# Patient Record
Sex: FEMALE | Race: WHITE | NOT HISPANIC OR LATINO | URBAN - NONMETROPOLITAN AREA
[De-identification: names, ages, dates, MRNs, and addresses within clinical notes are randomized per-mention and may not be internally consistent; named-entity substitution may affect disease eponyms.]

---

## 2018-04-19 ENCOUNTER — APPOINTMENT (RX ONLY)
Dept: URBAN - NONMETROPOLITAN AREA CLINIC 30 | Facility: CLINIC | Age: 33
Setting detail: DERMATOLOGY
End: 2018-04-19

## 2018-04-19 VITALS — DIASTOLIC BLOOD PRESSURE: 70 MMHG | HEIGHT: 67 IN | WEIGHT: 155 LBS | SYSTOLIC BLOOD PRESSURE: 120 MMHG

## 2018-04-19 DIAGNOSIS — L29.8 OTHER PRURITUS: ICD-10-CM

## 2018-04-19 DIAGNOSIS — L70.0 ACNE VULGARIS: ICD-10-CM

## 2018-04-19 DIAGNOSIS — L73.9 FOLLICULAR DISORDER, UNSPECIFIED: ICD-10-CM

## 2018-04-19 DIAGNOSIS — L738 OTHER SPECIFIED DISEASES OF HAIR AND HAIR FOLLICLES: ICD-10-CM

## 2018-04-19 DIAGNOSIS — L29.89 OTHER PRURITUS: ICD-10-CM

## 2018-04-19 DIAGNOSIS — L663 OTHER SPECIFIED DISEASES OF HAIR AND HAIR FOLLICLES: ICD-10-CM

## 2018-04-19 PROBLEM — L02.92 FURUNCLE, UNSPECIFIED: Status: ACTIVE | Noted: 2018-04-19

## 2018-04-19 PROCEDURE — 99215 OFFICE O/P EST HI 40 MIN: CPT

## 2018-04-19 PROCEDURE — ? COUNSELING

## 2018-04-19 PROCEDURE — ? PATIENT SPECIFIC COUNSELING

## 2018-04-19 PROCEDURE — ? ORDER TESTS

## 2018-04-19 PROCEDURE — ? PRESCRIPTION

## 2018-04-19 RX ORDER — ADAPALENE 3 MG/G
GEL TOPICAL
Qty: 1 | Refills: 4 | Status: ERX | COMMUNITY
Start: 2018-04-19

## 2018-04-19 RX ORDER — KETOCONAZOLE 20 MG/G
CREAM TOPICAL
Qty: 2 | Refills: 5 | Status: ERX | COMMUNITY
Start: 2018-04-19

## 2018-04-19 RX ADMIN — ADAPALENE: 3 GEL TOPICAL at 00:00

## 2018-04-19 RX ADMIN — KETOCONAZOLE: 20 CREAM TOPICAL at 00:00

## 2018-04-19 NOTE — PROCEDURE: MIPS QUALITY
Quality 128: Preventive Care And Screening: Body Mass Index (Bmi) Screening And Follow-Up Plan: BMI is documented above normal parameters and a follow-up plan is documented
Quality 111:Pneumonia Vaccination Status For Older Adults: Pneumococcal Vaccination Previously Received
Quality 431: Preventive Care And Screening: Unhealthy Alcohol Use - Screening: Patient screened for unhealthy alcohol use using a single question and scores less than 2 times per year
Quality 130: Documentation Of Current Medications In The Medical Record: Current Medications Documented
Quality 110: Preventive Care And Screening: Influenza Immunization: Influenza Immunization Administered during Influenza season
Quality 402: Tobacco Use And Help With Quitting Among Adolescents: Patient screened for tobacco and never smoked
Detail Level: Generalized
Quality 265: Biopsy Follow-Up: Biopsy results reviewed, communicated, tracked, and documented

## 2018-04-19 NOTE — PROCEDURE: COUNSELING
Topical Sulfur Applications Pregnancy And Lactation Text: This medication is Pregnancy Category C and has an unknown safety profile during pregnancy. It is unknown if this topical medication is excreted in breast milk.
Topical Sulfur Applications Counseling: Topical Sulfur Counseling: Patient counseled that this medication may cause skin irritation or allergic reactions.  In the event of skin irritation, the patient was advised to reduce the amount of the drug applied or use it less frequently.   The patient verbalized understanding of the proper use and possible adverse effects of topical sulfur application.  All of the patient's questions and concerns were addressed.
Bactrim Counseling:  I discussed with the patient the risks of sulfa antibiotics including but not limited to GI upset, allergic reaction, drug rash, diarrhea, dizziness, photosensitivity, and yeast infections.  Rarely, more serious reactions can occur including but not limited to aplastic anemia, agranulocytosis, methemoglobinemia, blood dyscrasias, liver or kidney failure, lung infiltrates or desquamative/blistering drug rashes.
Spironolactone Counseling: Patient advised regarding risks of diarrhea, abdominal pain, hyperkalemia, birth defects (for female patients), liver toxicity and renal toxicity. The patient may need blood work to monitor liver and kidney function and potassium levels while on therapy. The patient verbalized understanding of the proper use and possible adverse effects of spironolactone.  All of the patient's questions and concerns were addressed.
Tetracycline Pregnancy And Lactation Text: This medication is Pregnancy Category D and not consider safe during pregnancy. It is also excreted in breast milk.
Topical Retinoid counseling:  Patient advised to apply a pea-sized amount only at bedtime and wait 30 minutes after washing their face before applying.  If too drying, patient may add a non-comedogenic moisturizer. The patient verbalized understanding of the proper use and possible adverse effects of retinoids.  All of the patient's questions and concerns were addressed.
Erythromycin Pregnancy And Lactation Text: This medication is Pregnancy Category B and is considered safe during pregnancy. It is also excreted in breast milk.
Azithromycin Pregnancy And Lactation Text: This medication is considered safe during pregnancy and is also secreted in breast milk.
Birth Control Pills Counseling: Birth Control Pill Counseling: I discussed with the patient the potential side effects of OCPs including but not limited to increased risk of stroke, heart attack, thrombophlebitis, deep venous thrombosis, hepatic adenomas, breast changes, GI upset, headaches, and depression.  The patient verbalized understanding of the proper use and possible adverse effects of OCPs. All of the patient's questions and concerns were addressed.
Isotretinoin Pregnancy And Lactation Text: This medication is Pregnancy Category X and is considered extremely dangerous during pregnancy. It is unknown if it is excreted in breast milk.
Topical Retinoid Pregnancy And Lactation Text: This medication is Pregnancy Category C. It is unknown if this medication is excreted in breast milk.
Azithromycin Counseling:  I discussed with the patient the risks of azithromycin including but not limited to GI upset, allergic reaction, drug rash, diarrhea, and yeast infections.
Tazorac Counseling:  Patient advised that medication is irritating and drying.  Patient may need to apply sparingly and wash off after an hour before eventually leaving it on overnight.  The patient verbalized understanding of the proper use and possible adverse effects of tazorac.  All of the patient's questions and concerns were addressed.
Dapsone Pregnancy And Lactation Text: This medication is Pregnancy Category C and is not considered safe during pregnancy or breast feeding.
Tetracycline Counseling: Patient counseled regarding possible photosensitivity and increased risk for sunburn.  Patient instructed to avoid sunlight, if possible.  When exposed to sunlight, patients should wear protective clothing, sunglasses, and sunscreen.  The patient was instructed to call the office immediately if the following severe adverse effects occur:  hearing changes, easy bruising/bleeding, severe headache, or vision changes.  The patient verbalized understanding of the proper use and possible adverse effects of tetracycline.  All of the patient's questions and concerns were addressed. Patient understands to avoid pregnancy while on therapy due to potential birth defects.
High Dose Vitamin A Pregnancy And Lactation Text: High dose vitamin A therapy is contraindicated during pregnancy and breast feeding.
Include Pregnancy/Lactation Warning?: No
Bactrim Pregnancy And Lactation Text: This medication is Pregnancy Category D and is known to cause fetal risk.  It is also excreted in breast milk.
Tazorac Pregnancy And Lactation Text: This medication is not safe during pregnancy. It is unknown if this medication is excreted in breast milk.
Benzoyl Peroxide Pregnancy And Lactation Text: This medication is Pregnancy Category C. It is unknown if benzoyl peroxide is excreted in breast milk.
Topical Clindamycin Counseling: Patient counseled that this medication may cause skin irritation or allergic reactions.  In the event of skin irritation, the patient was advised to reduce the amount of the drug applied or use it less frequently.   The patient verbalized understanding of the proper use and possible adverse effects of clindamycin.  All of the patient's questions and concerns were addressed.
Spironolactone Pregnancy And Lactation Text: This medication can cause feminization of the male fetus and should be avoided during pregnancy. The active metabolite is also found in breast milk.
Isotretinoin Counseling: Patient should get monthly blood tests, not donate blood, not drive at night if vision affected, not share medication, and not undergo elective surgery for 6 months after tx completed. Side effects reviewed, pt to contact office should one occur.
Dapsone Counseling: I discussed with the patient the risks of dapsone including but not limited to hemolytic anemia, agranulocytosis, rashes, methemoglobinemia, kidney failure, peripheral neuropathy, headaches, GI upset, and liver toxicity.  Patients who start dapsone require monitoring including baseline LFTs and weekly CBCs for the first month, then every month thereafter.  The patient verbalized understanding of the proper use and possible adverse effects of dapsone.  All of the patient's questions and concerns were addressed.
Benzoyl Peroxide Counseling: Patient counseled that medicine may cause skin irritation and bleach clothing.  In the event of skin irritation, the patient was advised to reduce the amount of the drug applied or use it less frequently.   The patient verbalized understanding of the proper use and possible adverse effects of benzoyl peroxide.  All of the patient's questions and concerns were addressed.
Detail Level: Zone
Detail Level: Generalized
Doxycycline Pregnancy And Lactation Text: This medication is Pregnancy Category D and not consider safe during pregnancy. It is also excreted in breast milk but is considered safe for shorter treatment courses.
High Dose Vitamin A Counseling: Side effects reviewed, pt to contact office should one occur.
Minocycline Counseling: Patient advised regarding possible photosensitivity and discoloration of the teeth, skin, lips, tongue and gums.  Patient instructed to avoid sunlight, if possible.  When exposed to sunlight, patients should wear protective clothing, sunglasses, and sunscreen.  The patient was instructed to call the office immediately if the following severe adverse effects occur:  hearing changes, easy bruising/bleeding, severe headache, or vision changes.  The patient verbalized understanding of the proper use and possible adverse effects of minocycline.  All of the patient's questions and concerns were addressed.
Topical Clindamycin Pregnancy And Lactation Text: This medication is Pregnancy Category B and is considered safe during pregnancy. It is unknown if it is excreted in breast milk.
Doxycycline Counseling:  Patient counseled regarding possible photosensitivity and increased risk for sunburn.  Patient instructed to avoid sunlight, if possible.  When exposed to sunlight, patients should wear protective clothing, sunglasses, and sunscreen.  The patient was instructed to call the office immediately if the following severe adverse effects occur:  hearing changes, easy bruising/bleeding, severe headache, or vision changes.  The patient verbalized understanding of the proper use and possible adverse effects of doxycycline.  All of the patient's questions and concerns were addressed.
Birth Control Pills Pregnancy And Lactation Text: This medication should be avoided if pregnant and for the first 30 days post-partum.
Erythromycin Counseling:  I discussed with the patient the risks of erythromycin including but not limited to GI upset, allergic reaction, drug rash, diarrhea, increase in liver enzymes, and yeast infections.

## 2018-04-19 NOTE — PROCEDURE: PATIENT SPECIFIC COUNSELING
Detail Level: Detailed
Stop loofah scrubbing. Didn’t like tretinoin in past.
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 She has recently received treatment from a dermatologist in [place] and then from here PCP with multiple course of oral antifungal s and it improves. She has a bag of numerous topical OTC and prescriptions. Zinc bar has helped but she stopped bc thought she should try other things.\\n\\nI advised that she has mild acne on chin but does not have pityrosporum folliculitis at that location. On the trunk there were occasional 2 mm pink papule pustules. Discussed hepatotoxicity can occur with systemic antifungals and not best management long term for this chronic problem. Advised resume zinc bar once daily, stop scrubbing with loofah and try ketoconazole cream again.\\n\\nSlien was tearful throughout most of the visit and for a large part of the visit she resused to allow my assistant in the room to scribe.\\nSlien had questions at end of visit about tests to determine effectiveness of psychiatric meds and I advised she call her psychiatrist to discuss. She appeared anxious and depressed today.
Pt complains of severe itch and burn to all of her skin nightly. Not just at areas of rash or when rash is active. Itch does not waken from sleep but worse in past year. She states has not had we for generalized pruritus but read about it.  Advised milder skin care, vanicream to be continued. Consider decrease bathing to once daily\\n\\nRV 1 week
Patient uses 2% pyrithione zinc bar, selsum blue, zinc therapy soap.

## 2018-11-01 ENCOUNTER — RX ONLY (OUTPATIENT)
Age: 33
Setting detail: RX ONLY
End: 2018-11-01

## 2018-11-01 ENCOUNTER — APPOINTMENT (RX ONLY)
Dept: URBAN - NONMETROPOLITAN AREA CLINIC 29 | Facility: CLINIC | Age: 33
Setting detail: DERMATOLOGY
End: 2018-11-01

## 2018-11-01 VITALS — HEIGHT: 67 IN | WEIGHT: 180 LBS | SYSTOLIC BLOOD PRESSURE: 110 MMHG | DIASTOLIC BLOOD PRESSURE: 78 MMHG

## 2018-11-01 DIAGNOSIS — L71.0 PERIORAL DERMATITIS: ICD-10-CM

## 2018-11-01 DIAGNOSIS — B36.8 OTHER SPECIFIED SUPERFICIAL MYCOSES: ICD-10-CM

## 2018-11-01 PROCEDURE — ? PRESCRIPTION

## 2018-11-01 PROCEDURE — 99213 OFFICE O/P EST LOW 20 MIN: CPT

## 2018-11-01 PROCEDURE — ? PATIENT SPECIFIC COUNSELING

## 2018-11-01 PROCEDURE — ? COUNSELING

## 2018-11-01 RX ORDER — ITRACONAZOLE 200 MG/1
TABLET ORAL
Qty: 2 | Refills: 3 | Status: ERX | COMMUNITY
Start: 2018-11-01

## 2018-11-01 RX ORDER — CLINDAMYCIN PHOSPHATE 10 MG/ML
LOTION TOPICAL
Qty: 1 | Refills: 2 | Status: ERX | COMMUNITY
Start: 2018-11-01

## 2018-11-01 RX ORDER — ITRACONAZOLE 200 MG/1
TABLET ORAL
Qty: 7 | Refills: 3 | Status: ERX

## 2018-11-01 RX ORDER — KETOCONAZOLE 20 MG/G
CREAM TOPICAL
Qty: 2 | Refills: 5 | Status: ERX

## 2018-11-01 RX ADMIN — CLINDAMYCIN PHOSPHATE: 10 LOTION TOPICAL at 00:00

## 2018-11-01 RX ADMIN — ITRACONAZOLE: 200 TABLET ORAL at 00:00

## 2018-11-01 ASSESSMENT — LOCATION SIMPLE DESCRIPTION DERM
LOCATION SIMPLE: RIGHT CHEEK
LOCATION SIMPLE: LEFT LIP
LOCATION SIMPLE: CHEST

## 2018-11-01 ASSESSMENT — LOCATION DETAILED DESCRIPTION DERM
LOCATION DETAILED: LEFT LOWER CUTANEOUS LIP
LOCATION DETAILED: MIDDLE STERNUM
LOCATION DETAILED: RIGHT INFERIOR MEDIAL MALAR CHEEK

## 2018-11-01 ASSESSMENT — LOCATION ZONE DERM
LOCATION ZONE: FACE
LOCATION ZONE: LIP
LOCATION ZONE: TRUNK

## 2018-11-01 NOTE — PROCEDURE: PATIENT SPECIFIC COUNSELING
Recommend patient take itraconazole for pityrosporum folliculitis flare, taking 200mg daily for 7 days, then repeat prn for flares. Pharmacy called to inform me that patient is taking Xanax prn for anxiety. The patient was instructed to hold Xanax if she plans to use itraconazole as these medications are contraindicated. The patient expressed understanding and is in agreement with this plan.
The patient has a history of pityrosporum folliculiutis and acne, though this eruption on the face is described as “new and different” by the patient. The pink papules and a few pustules are located in the right perinasal and oral areas, primarily in the melolabial fold. The clinical appearance is most consistent with a perioral dermatitis. We discussed the options of topical clindamycin versus oral minocycline, though patient has a history of UTI when taking minocycline and therefore we will proceed with topical treatment alone. We discussed the expectations for treatment and that resolution may take several weeks. The patient expresses understanding and is in agreement with this plan.
Detail Level: Detailed

## 2018-11-01 NOTE — HPI: RASH
What Type Of Note Output Would You Prefer (Optional)?: Standard Output
How Severe Is Your Rash?: mild
Is This A New Presentation, Or A Follow-Up?: Rash
Additional History: Patient taking thyroxine 2 months ago  Patient used adapelene made worse Retina Only used lily cream 5 ingredient zinc face soap

## 2018-11-01 NOTE — PROCEDURE: MIPS QUALITY
Quality 111:Pneumonia Vaccination Status For Older Adults: Pneumococcal Vaccination Previously Received
Quality 110: Preventive Care And Screening: Influenza Immunization: Influenza Immunization Administered during Influenza season
Quality 431: Preventive Care And Screening: Unhealthy Alcohol Use - Screening: Patient screened for unhealthy alcohol use using a single question and scores less than 2 times per year
Quality 265: Biopsy Follow-Up: Biopsy results reviewed, communicated, tracked, and documented
Quality 130: Documentation Of Current Medications In The Medical Record: Current Medications Documented
Quality 402: Tobacco Use And Help With Quitting Among Adolescents: Patient screened for tobacco and never smoked
Quality 128: Preventive Care And Screening: Body Mass Index (Bmi) Screening And Follow-Up Plan: BMI is documented above normal parameters and a follow-up plan is documented
Detail Level: Generalized

## 2020-10-02 ENCOUNTER — APPOINTMENT (RX ONLY)
Dept: URBAN - NONMETROPOLITAN AREA CLINIC 29 | Facility: CLINIC | Age: 35
Setting detail: DERMATOLOGY
End: 2020-10-02

## 2020-10-02 VITALS — TEMPERATURE: 97.7 F

## 2020-10-02 DIAGNOSIS — L71.8 OTHER ROSACEA: ICD-10-CM

## 2020-10-02 PROCEDURE — ? PRESCRIPTION

## 2020-10-02 PROCEDURE — 99212 OFFICE O/P EST SF 10 MIN: CPT

## 2020-10-02 PROCEDURE — ? PATIENT SPECIFIC COUNSELING

## 2020-10-02 PROCEDURE — ? MEDICATION COUNSELING

## 2020-10-02 PROCEDURE — ? COUNSELING

## 2020-10-02 PROCEDURE — ? IN-HOUSE DISPENSING PHARMACY

## 2020-10-02 RX ORDER — DOXYCYCLINE HYCLATE 100 MG/1
CAPSULE, GELATIN COATED ORAL
Qty: 60 | Refills: 0 | Status: ERX | COMMUNITY
Start: 2020-10-02

## 2020-10-02 RX ADMIN — DOXYCYCLINE HYCLATE: 100 CAPSULE, GELATIN COATED ORAL at 00:00

## 2020-10-02 ASSESSMENT — LOCATION DETAILED DESCRIPTION DERM
LOCATION DETAILED: INFERIOR MID FOREHEAD
LOCATION DETAILED: RIGHT CENTRAL MALAR CHEEK
LOCATION DETAILED: RIGHT CHIN
LOCATION DETAILED: LEFT CENTRAL MALAR CHEEK

## 2020-10-02 ASSESSMENT — LOCATION ZONE DERM: LOCATION ZONE: FACE

## 2020-10-02 ASSESSMENT — LOCATION SIMPLE DESCRIPTION DERM
LOCATION SIMPLE: CHIN
LOCATION SIMPLE: LEFT CHEEK
LOCATION SIMPLE: INFERIOR FOREHEAD
LOCATION SIMPLE: RIGHT CHEEK

## 2020-10-02 NOTE — PROCEDURE: IN-HOUSE DISPENSING PHARMACY
Product 69 Unit Type: mg
Product 28 Amount/Unit (Numbers Only): 30
Product 58 Refills: 0
Name Of Product 22: Clobetasol Cream
Product 22 Refills: 1
Product 1 Amount/Unit (Numbers Only): 120
Product 24 Unit Type: grams
Product 21 Price/Unit (In Dollars): 45.00
Product 48 Application Directions: Wash scalp 3 times weekly let sit for 5 minutes then rinse
Product 10 Application Directions: Apply to face in AM.
Product 21 Refills: 3
Name Of Product 25: Fluocinonide Cream
Render Refills If Set To 0: Yes
Name Of Product 51: Hydrating tretinoin 0.05%
Product 24 Amount/Unit (Numbers Only): 60
Product 32 Price/Unit (In Dollars): 50.00
Product 6 Refills: 2
Name Of Product 7: Dapsone 6% Gel
Name Of Product 24: Fluocinolone Scalp and Body Oil
Product 44 Price/Unit (In Dollars): 90.00
Product 26 Application Directions: Apply bid for 2 weeks
Product 44 Application Directions: Apply lightly once nightly
Product 25 Price/Unit (In Dollars): 40.00
Product 1 Application Directions: Wash face every morning
Name Of Product 61: Hydrating tretinoin 0.025%
Product 63 Application Directions: Apply pea sized amount nightly
Name Of Product 10: Female Hormonal Acne Gel
Product 4 Application Directions: Apply once daily in the AM
Product 44 Amount/Unit (Numbers Only): 240
Product 9 Application Directions: Apply pea sized amount to affected areas 2-3 nights per wk, work up to QHS.
Product 29 Price/Unit (In Dollars): 65.00
Product 11 Application Directions: Apply daily in AM
Product 5 Application Directions: Wash face daily
Name Of Product 42: Skin Brightening Hydroquinone 8% Combination
Product 28 Price/Unit (In Dollars): 30.00
Name Of Product 63: Hydrating tretinoin 0.1%
Product 62 Application Directions: Apply daily to legs
Name Of Product 44: Anti-aging Body Tretinoin
Name Of Product 29: Triamcinolone Cream (120 grams)
Name Of Product 46: Anti fungal nail solution
Product 46 Application Directions: Apply to affected nails daily x 12 weeks
Product 12 Application Directions: Apply pea sized amount to face BID
Name Of Product 5: BP 8% Acne Wash
Name Of Product 45: Skin brightening hydroquinone 8% combination sensitive skin
Product 21 Application Directions: Lather to ear 3 times weekly as needed
Name Of Product 23: Clobetasol Ointment
Name Of Product 31: Urea 40% cream
Name Of Product 41: AK Imiquimod Gel
Product 2 Application Directions: Apply a pea-sized amount to entire face once nightly.
Product 33 Application Directions: AppLy to scalp 3 times per week let sit 5 minutes then rinse
Product 22 Application Directions: Apply twice a day for 2 weeks then stop for 1 week.
Name Of Product 62: Arnica bruise cream
Name Of Product 11: Metronidazole Gel
Name Of Product 48: Seborrheic Dermatitis Shampoo
Name Of Product 9: Hydrating 0.05% Tretinoin Cream
Name Of Product 8: Tretinoin 0.025% / Clindamycin Combination Cream
Name Of Product 3: Adapalene 0.3% Gel
Name Of Product 21: Anti-Fungal Shampoo
Product 32 Unit Type: tube(s)
Name Of Product 6: Clindamycin Gel
Name Of Product 30: Clobetasol solution
Product 29 Application Directions: Apply to affected areas bid for two weeks per month.
Product 61 Application Directions: Apply pea sized amount nightly to face
Name Of Product 2: Acne Triple Gel Combination
Product 27 Application Directions: Apply twice daily to affected areas for up to 2 weeks/month PRN.
Name Of Product 4: BP 2.5% / Clindamycin Combination Gel
Product 5 Price/Unit (In Dollars): 0.00
Name Of Product 33: Seborrheic Dermatitis Shampoo 120ml
Product 32 Refills: 5
Name Of Product 27: Hydrocortisone 2.5% / Tranilast 0.5% / Levo 2%
Product 10 Price/Unit (In Dollars): 50
Name Of Product 26: Ketoconazole and hydrocortisone cream
Product 31 Application Directions: Apply to affected area twice daily for two weeks.
Product 43 Application Directions: Spray on scalp once daily
Name Of Product 32: Tacrolimus 0.1% Ointment
Product 42 Application Directions: Apply topically to face once nightly, wash in AM. Wear sunscreen
Product 30 Application Directions: Apply BID to scalp x 2 weeks
Detail Level: Zone
Name Of Product 43: Alopecia Topical Solution for Men
Name Of Product 1: Sodium sulfacetamide 8%
Product 23 Application Directions: Apply to affected areas daily up to two weeks per month
Product 24 Application Directions: Spray on shoulders after shower
Name Of Product 12: Rosacea Triple Combination Gel
Name Of Product 13: Rosacea Cream
Name Of Product 28: Triamcinolone Cream (30 gram)

## 2020-10-02 NOTE — PROCEDURE: PATIENT SPECIFIC COUNSELING
Will start course of doxycycline, follow up in one month and then may consider Oracea.
Detail Level: Detailed

## 2020-10-02 NOTE — PROCEDURE: MIPS QUALITY
Detail Level: Detailed
Quality 130: Documentation Of Current Medications In The Medical Record: Current Medications Documented
Quality 431: Preventive Care And Screening: Unhealthy Alcohol Use - Screening: Patient screened for unhealthy alcohol use using a single question and scores less than 2 times per year
Quality 265: Biopsy Follow-Up: Biopsy results reviewed, communicated, tracked, and documented

## 2020-10-02 NOTE — PROCEDURE: MEDICATION COUNSELING
Acitretin Pregnancy And Lactation Text: This medication is Pregnancy Category X and should not be given to women who are pregnant or may become pregnant in the future. This medication is excreted in breast milk.
5-Fu Pregnancy And Lactation Text: This medication is Pregnancy Category X and contraindicated in pregnancy and in women who may become pregnant. It is unknown if this medication is excreted in breast milk.
Odomzo Counseling- I discussed with the patient the risks of Odomzo including but not limited to nausea, vomiting, diarrhea, constipation, weight loss, changes in the sense of taste, decreased appetite, muscle spasms, and hair loss.  The patient verbalized understanding of the proper use and possible adverse effects of Odomzo.  All of the patient's questions and concerns were addressed.
Picato Pregnancy And Lactation Text: This medication is Pregnancy Category C. It is unknown if this medication is excreted in breast milk.
Azathioprine Pregnancy And Lactation Text: This medication is Pregnancy Category D and isn't considered safe during pregnancy. It is unknown if this medication is excreted in breast milk.
Cephalexin Counseling: I counseled the patient regarding use of cephalexin as an antibiotic for prophylactic and/or therapeutic purposes. Cephalexin (commonly prescribed under brand name Keflex) is a cephalosporin antibiotic which is active against numerous classes of bacteria, including most skin bacteria. Side effects may include nausea, diarrhea, gastrointestinal upset, rash, hives, yeast infections, and in rare cases, hepatitis, kidney disease, seizures, fever, confusion, neurologic symptoms, and others. Patients with severe allergies to penicillin medications are cautioned that there is about a 10% incidence of cross-reactivity with cephalosporins. When possible, patients with penicillin allergies should use alternatives to cephalosporins for antibiotic therapy.
Oxybutynin Pregnancy And Lactation Text: This medication is Pregnancy Category B and is considered safe during pregnancy. It is unknown if it is excreted in breast milk.
Metronidazole Counseling:  I discussed with the patient the risks of metronidazole including but not limited to seizures, nausea/vomiting, a metallic taste in the mouth, nausea/vomiting and severe allergy.
Quinolones Pregnancy And Lactation Text: This medication is Pregnancy Category C and it isn't know if it is safe during pregnancy. It is also excreted in breast milk.
Clofazimine Pregnancy And Lactation Text: This medication is Pregnancy Category C and isn't considered safe during pregnancy. It is excreted in breast milk.
Elidel Counseling: Patient may experience a mild burning sensation during topical application. Elidel is not approved in children less than 2 years of age. There have been case reports of hematologic and skin malignancies in patients using topical calcineurin inhibitors although causality is questionable.
Ketoconazole Counseling:   Patient counseled regarding improving absorption with orange juice.  Adverse effects include but are not limited to breast enlargement, headache, diarrhea, nausea, upset stomach, liver function test abnormalities, taste disturbance, and stomach pain.  There is a rare possibility of liver failure that can occur when taking ketoconazole. The patient understands that monitoring of LFTs may be required, especially at baseline. The patient verbalized understanding of the proper use and possible adverse effects of ketoconazole.  All of the patient's questions and concerns were addressed.
Doxepin Counseling:  Patient advised that the medication is sedating and not to drive a car after taking this medication. Patient informed of potential adverse effects including but not limited to dry mouth, urinary retention, and blurry vision.  The patient verbalized understanding of the proper use and possible adverse effects of doxepin.  All of the patient's questions and concerns were addressed.
Topical Clindamycin Counseling: Patient counseled that this medication may cause skin irritation or allergic reactions.  In the event of skin irritation, the patient was advised to reduce the amount of the drug applied or use it less frequently.   The patient verbalized understanding of the proper use and possible adverse effects of clindamycin.  All of the patient's questions and concerns were addressed.
Taltz Pregnancy And Lactation Text: The risk during pregnancy and breastfeeding is uncertain with this medication.
Thalidomide Pregnancy And Lactation Text: This medication is Pregnancy Category X and is absolutely contraindicated during pregnancy. It is unknown if it is excreted in breast milk.
Oxybutynin Counseling:  I discussed with the patient the risks of oxybutynin including but not limited to skin rash, drowsiness, dry mouth, difficulty urinating, and blurred vision.
Fluconazole Counseling:  Patient counseled regarding adverse effects of fluconazole including but not limited to headache, diarrhea, nausea, upset stomach, liver function test abnormalities, taste disturbance, and stomach pain.  There is a rare possibility of liver failure that can occur when taking fluconazole.  The patient understands that monitoring of LFTs and kidney function test may be required, especially at baseline. The patient verbalized understanding of the proper use and possible adverse effects of fluconazole.  All of the patient's questions and concerns were addressed.
Stelara Counseling:  I discussed with the patient the risks of ustekinumab including but not limited to immunosuppression, malignancy, posterior leukoencephalopathy syndrome, and serious infections.  The patient understands that monitoring is required including a PPD at baseline and must alert us or the primary physician if symptoms of infection or other concerning signs are noted.
Spironolactone Pregnancy And Lactation Text: This medication can cause feminization of the male fetus and should be avoided during pregnancy. The active metabolite is also found in breast milk.
Quinolones Counseling:  I discussed with the patient the risks of fluoroquinolones including but not limited to GI upset, allergic reaction, drug rash, diarrhea, dizziness, photosensitivity, yeast infections, liver function test abnormalities, tendonitis/tendon rupture.
Albendazole Pregnancy And Lactation Text: This medication is Pregnancy Category C and it isn't known if it is safe during pregnancy. It is also excreted in breast milk.
Sski Pregnancy And Lactation Text: This medication is Pregnancy Category D and isn't considered safe during pregnancy. It is excreted in breast milk.
Xeljanz Counseling: I discussed with the patient the risks of Xeljanz therapy including increased risk of infection, liver issues, headache, diarrhea, or cold symptoms. Live vaccines should be avoided. They were instructed to call if they have any problems.
Azithromycin Pregnancy And Lactation Text: This medication is considered safe during pregnancy and is also secreted in breast milk.
Hydroxyzine Pregnancy And Lactation Text: This medication is not safe during pregnancy and should not be taken. It is also excreted in breast milk and breast feeding isn't recommended.
Doxycycline Counseling:  Patient counseled regarding possible photosensitivity and increased risk for sunburn.  Patient instructed to avoid sunlight, if possible.  When exposed to sunlight, patients should wear protective clothing, sunglasses, and sunscreen.  The patient was instructed to call the office immediately if the following severe adverse effects occur:  hearing changes, easy bruising/bleeding, severe headache, or vision changes.  The patient verbalized understanding of the proper use and possible adverse effects of doxycycline.  All of the patient's questions and concerns were addressed.
Siliq Counseling:  I discussed with the patient the risks of Siliq including but not limited to new or worsening depression, suicidal thoughts and behavior, immunosuppression, malignancy, posterior leukoencephalopathy syndrome, and serious infections.  The patient understands that monitoring is required including a PPD at baseline and must alert us or the primary physician if symptoms of infection or other concerning signs are noted. There is also a special program designed to monitor depression which is required with Siliq.
Valtrex Pregnancy And Lactation Text: this medication is Pregnancy Category B and is considered safe during pregnancy. This medication is not directly found in breast milk but it's metabolite acyclovir is present.
Xolair Counseling:  Patient informed of potential adverse effects including but not limited to fever, muscle aches, rash and allergic reactions.  The patient verbalized understanding of the proper use and possible adverse effects of Xolair.  All of the patient's questions and concerns were addressed.
Hydroxychloroquine Pregnancy And Lactation Text: This medication has been shown to cause fetal harm but it isn't assigned a Pregnancy Risk Category. There are small amounts excreted in breast milk.
Taltz Counseling: I discussed with the patient the risks of ixekizumab including but not limited to immunosuppression, serious infections, worsening of inflammatory bowel disease and drug reactions.  The patient understands that monitoring is required including a PPD at baseline and must alert us or the primary physician if symptoms of infection or other concerning signs are noted.
Erivedge Counseling- I discussed with the patient the risks of Erivedge including but not limited to nausea, vomiting, diarrhea, constipation, weight loss, changes in the sense of taste, decreased appetite, muscle spasms, and hair loss.  The patient verbalized understanding of the proper use and possible adverse effects of Erivedge.  All of the patient's questions and concerns were addressed.
High Dose Vitamin A Counseling: Side effects reviewed, pt to contact office should one occur.
Clindamycin Counseling: I counseled the patient regarding use of clindamycin as an antibiotic for prophylactic and/or therapeutic purposes. Clindamycin is active against numerous classes of bacteria, including skin bacteria. Side effects may include nausea, diarrhea, gastrointestinal upset, rash, hives, yeast infections, and in rare cases, colitis.
Bactrim Counseling:  I discussed with the patient the risks of sulfa antibiotics including but not limited to GI upset, allergic reaction, drug rash, diarrhea, dizziness, photosensitivity, and yeast infections.  Rarely, more serious reactions can occur including but not limited to aplastic anemia, agranulocytosis, methemoglobinemia, blood dyscrasias, liver or kidney failure, lung infiltrates or desquamative/blistering drug rashes.
Cephalexin Pregnancy And Lactation Text: This medication is Pregnancy Category B and considered safe during pregnancy.  It is also excreted in breast milk but can be used safely for shorter doses.
Erythromycin Pregnancy And Lactation Text: This medication is Pregnancy Category B and is considered safe during pregnancy. It is also excreted in breast milk.
Isotretinoin Counseling: Patient should get monthly blood tests, not donate blood, not drive at night if vision affected, not share medication, and not undergo elective surgery for 6 months after tx completed. Side effects reviewed, pt to contact office should one occur.
Clindamycin Pregnancy And Lactation Text: This medication can be used in pregnancy if certain situations. Clindamycin is also present in breast milk.
Infliximab Pregnancy And Lactation Text: This medication is Pregnancy Category B and is considered safe during pregnancy. It is unknown if this medication is excreted in breast milk.
Carac Counseling:  I discussed with the patient the risks of Carac including but not limited to erythema, scaling, itching, weeping, crusting, and pain.
Azithromycin Counseling:  I discussed with the patient the risks of azithromycin including but not limited to GI upset, allergic reaction, drug rash, diarrhea, and yeast infections.
Cimzia Pregnancy And Lactation Text: This medication crosses the placenta but can be considered safe in certain situations. Cimzia may be excreted in breast milk.
Doxepin Pregnancy And Lactation Text: This medication is Pregnancy Category C and it isn't known if it is safe during pregnancy. It is also excreted in breast milk and breast feeding isn't recommended.
Picato Counseling:  I discussed with the patient the risks of Picato including but not limited to erythema, scaling, itching, weeping, crusting, and pain.
5-Fu Counseling: 5-Fluorouracil Counseling:  I discussed with the patient the risks of 5-fluorouracil including but not limited to erythema, scaling, itching, weeping, crusting, and pain.
Mirvaso Counseling: Mirvaso is a topical medication which can decrease superficial blood flow where applied. Side effects are uncommon and include stinging, redness and allergic reactions.
Minoxidil Pregnancy And Lactation Text: This medication has not been assigned a Pregnancy Risk Category but animal studies failed to show danger with the topical medication. It is unknown if the medication is excreted in breast milk.
SSKI Counseling:  I discussed with the patient the risks of SSKI including but not limited to thyroid abnormalities, metallic taste, GI upset, fever, headache, acne, arthralgias, paraesthesias, lymphadenopathy, easy bleeding, arrhythmias, and allergic reaction.
Niacinamide Pregnancy And Lactation Text: These medications are considered safe during pregnancy.
Bexarotene Pregnancy And Lactation Text: This medication is Pregnancy Category X and should not be given to women who are pregnant or may become pregnant. This medication should not be used if you are breast feeding.
Rhofade Pregnancy And Lactation Text: This medication has not been assigned a Pregnancy Risk Category. It is unknown if the medication is excreted in breast milk.
Spironolactone Counseling: Patient advised regarding risks of diarrhea, abdominal pain, hyperkalemia, birth defects (for female patients), liver toxicity and renal toxicity. The patient may need blood work to monitor liver and kidney function and potassium levels while on therapy. The patient verbalized understanding of the proper use and possible adverse effects of spironolactone.  All of the patient's questions and concerns were addressed.
Doxycycline Pregnancy And Lactation Text: This medication is Pregnancy Category D and not consider safe during pregnancy. It is also excreted in breast milk but is considered safe for shorter treatment courses.
Simponi Counseling:  I discussed with the patient the risks of golimumab including but not limited to myelosuppression, immunosuppression, autoimmune hepatitis, demyelinating diseases, lymphoma, and serious infections.  The patient understands that monitoring is required including a PPD at baseline and must alert us or the primary physician if symptoms of infection or other concerning signs are noted.
Propranolol Counseling:  I discussed with the patient the risks of propranolol including but not limited to low heart rate, low blood pressure, low blood sugar, restlessness and increased cold sensitivity. They should call the office if they experience any of these side effects.
Protopic Counseling: Patient may experience a mild burning sensation during topical application. Protopic is not approved in children less than 2 years of age. There have been case reports of hematologic and skin malignancies in patients using topical calcineurin inhibitors although causality is questionable.
Tremfya Counseling: I discussed with the patient the risks of guselkumab including but not limited to immunosuppression, serious infections, worsening of inflammatory bowel disease and drug reactions.  The patient understands that monitoring is required including a PPD at baseline and must alert us or the primary physician if symptoms of infection or other concerning signs are noted.
Xolair Pregnancy And Lactation Text: This medication is Pregnancy Category B and is considered safe during pregnancy. This medication is excreted in breast milk.
Albendazole Counseling:  I discussed with the patient the risks of albendazole including but not limited to cytopenia, kidney damage, nausea/vomiting and severe allergy.  The patient understands that this medication is being used in an off-label manner.
Tetracycline Counseling: Patient counseled regarding possible photosensitivity and increased risk for sunburn.  Patient instructed to avoid sunlight, if possible.  When exposed to sunlight, patients should wear protective clothing, sunglasses, and sunscreen.  The patient was instructed to call the office immediately if the following severe adverse effects occur:  hearing changes, easy bruising/bleeding, severe headache, or vision changes.  The patient verbalized understanding of the proper use and possible adverse effects of tetracycline.  All of the patient's questions and concerns were addressed. Patient understands to avoid pregnancy while on therapy due to potential birth defects.
Rituxan Counseling:  I discussed with the patient the risks of Rituxan infusions. Side effects can include infusion reactions, severe drug rashes including mucocutaneous reactions, reactivation of latent hepatitis and other infections and rarely progressive multifocal leukoencephalopathy.  All of the patient's questions and concerns were addressed.
Birth Control Pills Pregnancy And Lactation Text: This medication should be avoided if pregnant and for the first 30 days post-partum.
Finasteride Pregnancy And Lactation Text: This medication is absolutely contraindicated during pregnancy. It is unknown if it is excreted in breast milk.
Nsaids Pregnancy And Lactation Text: These medications are considered safe up to 30 weeks gestation. It is excreted in breast milk.
Imiquimod Counseling:  I discussed with the patient the risks of imiquimod including but not limited to erythema, scaling, itching, weeping, crusting, and pain.  Patient understands that the inflammatory response to imiquimod is variable from person to person and was educated regarded proper titration schedule.  If flu-like symptoms develop, patient knows to discontinue the medication and contact us.
Thalidomide Counseling: I discussed with the patient the risks of thalidomide including but not limited to birth defects, anxiety, weakness, chest pain, dizziness, cough and severe allergy.
Drysol Counseling:  I discussed with the patient the risks of drysol/aluminum chloride including but not limited to skin rash, itching, irritation, burning.
Calcipotriene Pregnancy And Lactation Text: This medication has not been proven safe during pregnancy. It is unknown if this medication is excreted in breast milk.
Cyclosporine Pregnancy And Lactation Text: This medication is Pregnancy Category C and it isn't know if it is safe during pregnancy. This medication is excreted in breast milk.
Tazorac Pregnancy And Lactation Text: This medication is not safe during pregnancy. It is unknown if this medication is excreted in breast milk.
Cimetidine Pregnancy And Lactation Text: This medication is Pregnancy Category B and is considered safe during pregnancy. It is also excreted in breast milk and breast feeding isn't recommended.
Niacinamide Counseling: I recommended taking niacin or niacinamide, also know as vitamin B3, twice daily. Recent evidence suggests that taking vitamin B3 (500 mg twice daily) can reduce the risk of actinic keratoses and non-melanoma skin cancers. Side effects of vitamin B3 include flushing and headache.
Wartpeel Counseling:  I discussed with the patient the risks of Wartpeel including but not limited to erythema, scaling, itching, weeping, crusting, and pain.
Tranexamic Acid Counseling:  Patient advised of the small risk of bleeding problems with tranexamic acid. They were also instructed to call if they developed any nausea, vomiting or diarrhea. All of the patient's questions and concerns were addressed.
Ilumya Counseling: I discussed with the patient the risks of tildrakizumab including but not limited to immunosuppression, malignancy, posterior leukoencephalopathy syndrome, and serious infections.  The patient understands that monitoring is required including a PPD at baseline and must alert us or the primary physician if symptoms of infection or other concerning signs are noted.
Minocycline Counseling: Patient advised regarding possible photosensitivity and discoloration of the teeth, skin, lips, tongue and gums.  Patient instructed to avoid sunlight, if possible.  When exposed to sunlight, patients should wear protective clothing, sunglasses, and sunscreen.  The patient was instructed to call the office immediately if the following severe adverse effects occur:  hearing changes, easy bruising/bleeding, severe headache, or vision changes.  The patient verbalized understanding of the proper use and possible adverse effects of minocycline.  All of the patient's questions and concerns were addressed.
Glycopyrrolate Counseling:  I discussed with the patient the risks of glycopyrrolate including but not limited to skin rash, drowsiness, dry mouth, difficulty urinating, and blurred vision.
Otezla Counseling: The side effects of Otezla were discussed with the patient, including but not limited to worsening or new depression, weight loss, diarrhea, nausea, upper respiratory tract infection, and headache. Patient instructed to call the office should any adverse effect occur.  The patient verbalized understanding of the proper use and possible adverse effects of Otezla.  All the patient's questions and concerns were addressed.
Cimetidine Counseling:  I discussed with the patient the risks of Cimetidine including but not limited to gynecomastia, headache, diarrhea, nausea, drowsiness, arrhythmias, pancreatitis, skin rashes, psychosis, bone marrow suppression and kidney toxicity.
Calcipotriene Counseling:  I discussed with the patient the risks of calcipotriene including but not limited to erythema, scaling, itching, and irritation.
Ivermectin Counseling:  Patient instructed to take medication on an empty stomach with a full glass of water.  Patient informed of potential adverse effects including but not limited to nausea, diarrhea, dizziness, itching, and swelling of the extremities or lymph nodes.  The patient verbalized understanding of the proper use and possible adverse effects of ivermectin.  All of the patient's questions and concerns were addressed.
Dupixent Pregnancy And Lactation Text: This medication likely crosses the placenta but the risk for the fetus is uncertain. This medication is excreted in breast milk.
Cimzia Counseling:  I discussed with the patient the risks of Cimzia including but not limited to immunosuppression, allergic reactions and infections.  The patient understands that monitoring is required including a PPD at baseline and must alert us or the primary physician if symptoms of infection or other concerning signs are noted.
Clofazimine Counseling:  I discussed with the patient the risks of clofazimine including but not limited to skin and eye pigmentation, liver damage, nausea/vomiting, gastrointestinal bleeding and allergy.
Bactrim Pregnancy And Lactation Text: This medication is Pregnancy Category D and is known to cause fetal risk.  It is also excreted in breast milk.
Drysol Pregnancy And Lactation Text: This medication is considered safe during pregnancy and breast feeding.
Finasteride Male Counseling: Finasteride Counseling:  I discussed with the patient the risks of use of finasteride including but not limited to decreased libido, decreased ejaculate volume, gynecomastia, and depression. Women should not handle medication.  All of the patient's questions and concerns were addressed.
Infliximab Counseling:  I discussed with the patient the risks of infliximab including but not limited to myelosuppression, immunosuppression, autoimmune hepatitis, demyelinating diseases, lymphoma, and serious infections.  The patient understands that monitoring is required including a PPD at baseline and must alert us or the primary physician if symptoms of infection or other concerning signs are noted.
Protopic Pregnancy And Lactation Text: This medication is Pregnancy Category C. It is unknown if this medication is excreted in breast milk when applied topically.
Isotretinoin Pregnancy And Lactation Text: This medication is Pregnancy Category X and is considered extremely dangerous during pregnancy. It is unknown if it is excreted in breast milk.
Itraconazole Counseling:  I discussed with the patient the risks of itraconazole including but not limited to liver damage, nausea/vomiting, neuropathy, and severe allergy.  The patient understands that this medication is best absorbed when taken with acidic beverages such as non-diet cola or ginger ale.  The patient understands that monitoring is required including baseline LFTs and repeat LFTs at intervals.  The patient understands that they are to contact us or the primary physician if concerning signs are noted.
Glycopyrrolate Pregnancy And Lactation Text: This medication is Pregnancy Category B and is considered safe during pregnancy. It is unknown if it is excreted breast milk.
Benzoyl Peroxide Counseling: Patient counseled that medicine may cause skin irritation and bleach clothing.  In the event of skin irritation, the patient was advised to reduce the amount of the drug applied or use it less frequently.   The patient verbalized understanding of the proper use and possible adverse effects of benzoyl peroxide.  All of the patient's questions and concerns were addressed.
Topical Retinoid counseling:  Patient advised to apply a pea-sized amount only at bedtime and wait 30 minutes after washing their face before applying.  If too drying, patient may add a non-comedogenic moisturizer. The patient verbalized understanding of the proper use and possible adverse effects of retinoids.  All of the patient's questions and concerns were addressed.
Dapsone Counseling: I discussed with the patient the risks of dapsone including but not limited to hemolytic anemia, agranulocytosis, rashes, methemoglobinemia, kidney failure, peripheral neuropathy, headaches, GI upset, and liver toxicity.  Patients who start dapsone require monitoring including baseline LFTs and weekly CBCs for the first month, then every month thereafter.  The patient verbalized understanding of the proper use and possible adverse effects of dapsone.  All of the patient's questions and concerns were addressed.
Benzoyl Peroxide Pregnancy And Lactation Text: This medication is Pregnancy Category C. It is unknown if benzoyl peroxide is excreted in breast milk.
Minocycline Pregnancy And Lactation Text: This medication is Pregnancy Category D and not consider safe during pregnancy. It is also excreted in breast milk.
Solaraze Counseling:  I discussed with the patient the risks of Solaraze including but not limited to erythema, scaling, itching, weeping, crusting, and pain.
Include Pregnancy/Lactation Warning?: No
Rhofade Counseling: Rhofade is a topical medication which can decrease superficial blood flow where applied. Side effects are uncommon and include stinging, redness and allergic reactions.
Hydroquinone Counseling:  Patient advised that medication may result in skin irritation, lightening (hypopigmentation), dryness, and burning.  In the event of skin irritation, the patient was advised to reduce the amount of the drug applied or use it less frequently.  Rarely, spots that are treated with hydroquinone can become darker (pseudoochronosis).  Should this occur, patient instructed to stop medication and call the office. The patient verbalized understanding of the proper use and possible adverse effects of hydroquinone.  All of the patient's questions and concerns were addressed.
Bexarotene Counseling:  I discussed with the patient the risks of bexarotene including but not limited to hair loss, dry lips/skin/eyes, liver abnormalities, hyperlipidemia, pancreatitis, depression/suicidal ideation, photosensitivity, drug rash/allergic reactions, hypothyroidism, anemia, leukopenia, infection, cataracts, and teratogenicity.  Patient understands that they will need regular blood tests to check lipid profile, liver function tests, white blood cell count, thyroid function tests and pregnancy test if applicable.
Zyclara Counseling:  I discussed with the patient the risks of imiquimod including but not limited to erythema, scaling, itching, weeping, crusting, and pain.  Patient understands that the inflammatory response to imiquimod is variable from person to person and was educated regarded proper titration schedule.  If flu-like symptoms develop, patient knows to discontinue the medication and contact us.
Eucrisa Counseling: Patient may experience a mild burning sensation during topical application. Eucrisa is not approved in children less than 2 years of age.
Enbrel Counseling:  I discussed with the patient the risks of etanercept including but not limited to myelosuppression, immunosuppression, autoimmune hepatitis, demyelinating diseases, lymphoma, and infections.  The patient understands that monitoring is required including a PPD at baseline and must alert us or the primary physician if symptoms of infection or other concerning signs are noted.
Acitretin Counseling:  I discussed with the patient the risks of acitretin including but not limited to hair loss, dry lips/skin/eyes, liver damage, hyperlipidemia, depression/suicidal ideation, photosensitivity.  Serious rare side effects can include but are not limited to pancreatitis, pseudotumor cerebri, bony changes, clot formation/stroke/heart attack.  Patient understands that alcohol is contraindicated since it can result in liver toxicity and significantly prolong the elimination of the drug by many years.
Humira Counseling:  I discussed with the patient the risks of adalimumab including but not limited to myelosuppression, immunosuppression, autoimmune hepatitis, demyelinating diseases, lymphoma, and serious infections.  The patient understands that monitoring is required including a PPD at baseline and must alert us or the primary physician if symptoms of infection or other concerning signs are noted.
Cyclophosphamide Counseling:  I discussed with the patient the risks of cyclophosphamide including but not limited to hair loss, hormonal abnormalities, decreased fertility, abdominal pain, diarrhea, nausea and vomiting, bone marrow suppression and infection. The patient understands that monitoring is required while taking this medication.
Dapsone Pregnancy And Lactation Text: This medication is Pregnancy Category C and is not considered safe during pregnancy or breast feeding.
Opioid Counseling: I discussed with the patient the potential side effects of opioids including but not limited to addiction, altered mental status, and depression. I stressed avoiding alcohol, benzodiazepines, muscle relaxants and sleep aids unless specifically okayed by a physician. The patient verbalized understanding of the proper use and possible adverse effects of opioids. All of the patient's questions and concerns were addressed. They were instructed to flush the remaining pills down the toilet if they did not need them for pain.
Cyclosporine Counseling:  I discussed with the patient the risks of cyclosporine including but not limited to hypertension, gingival hyperplasia,myelosuppression, immunosuppression, liver damage, kidney damage, neurotoxicity, lymphoma, and serious infections. The patient understands that monitoring is required including baseline blood pressure, CBC, CMP, lipid panel and uric acid, and then 1-2 times monthly CMP and blood pressure.
Cosentyx Counseling:  I discussed with the patient the risks of Cosentyx including but not limited to worsening of Crohn's disease, immunosuppression, allergic reactions and infections.  The patient understands that monitoring is required including a PPD at baseline and must alert us or the primary physician if symptoms of infection or other concerning signs are noted.
Opioid Pregnancy And Lactation Text: These medications can lead to premature delivery and should be avoided during pregnancy. These medications are also present in breast milk in small amounts.
Xelalyssiaz Pregnancy And Lactation Text: This medication is Pregnancy Category D and is not considered safe during pregnancy.  The risk during breast feeding is also uncertain.
Topical Sulfur Applications Counseling: Topical Sulfur Counseling: Patient counseled that this medication may cause skin irritation or allergic reactions.  In the event of skin irritation, the patient was advised to reduce the amount of the drug applied or use it less frequently.   The patient verbalized understanding of the proper use and possible adverse effects of topical sulfur application.  All of the patient's questions and concerns were addressed.
Sarecycline Counseling: Patient advised regarding possible photosensitivity and discoloration of the teeth, skin, lips, tongue and gums.  Patient instructed to avoid sunlight, if possible.  When exposed to sunlight, patients should wear protective clothing, sunglasses, and sunscreen.  The patient was instructed to call the office immediately if the following severe adverse effects occur:  hearing changes, easy bruising/bleeding, severe headache, or vision changes.  The patient verbalized understanding of the proper use and possible adverse effects of sarecycline.  All of the patient's questions and concerns were addressed.
High Dose Vitamin A Pregnancy And Lactation Text: High dose vitamin A therapy is contraindicated during pregnancy and breast feeding.
Nsaids Counseling: NSAID Counseling: I discussed with the patient that NSAIDs should be taken with food. Prolonged use of NSAIDs can result in the development of stomach ulcers.  Patient advised to stop taking NSAIDs if abdominal pain occurs.  The patient verbalized understanding of the proper use and possible adverse effects of NSAIDs.  All of the patient's questions and concerns were addressed.
Terbinafine Counseling: Patient counseling regarding adverse effects of terbinafine including but not limited to headache, diarrhea, rash, upset stomach, liver function test abnormalities, itching, taste/smell disturbance, nausea, abdominal pain, and flatulence.  There is a rare possibility of liver failure that can occur when taking terbinafine.  The patient understands that a baseline LFT and kidney function test may be required. The patient verbalized understanding of the proper use and possible adverse effects of terbinafine.  All of the patient's questions and concerns were addressed.
Rituxan Pregnancy And Lactation Text: This medication is Pregnancy Category C and it isn't know if it is safe during pregnancy. It is unknown if this medication is excreted in breast milk but similar antibodies are known to be excreted.
Tazorac Counseling:  Patient advised that medication is irritating and drying.  Patient may need to apply sparingly and wash off after an hour before eventually leaving it on overnight.  The patient verbalized understanding of the proper use and possible adverse effects of tazorac.  All of the patient's questions and concerns were addressed.
Tranexamic Acid Pregnancy And Lactation Text: It is unknown if this medication is safe during pregnancy or breast feeding.
Methotrexate Pregnancy And Lactation Text: This medication is Pregnancy Category X and is known to cause fetal harm. This medication is excreted in breast milk.
Cyclophosphamide Pregnancy And Lactation Text: This medication is Pregnancy Category D and it isn't considered safe during pregnancy. This medication is excreted in breast milk.
Dupixent Counseling: I discussed with the patient the risks of dupilumab including but not limited to eye infection and irritation, cold sores, injection site reactions, worsening of asthma, allergic reactions and increased risk of parasitic infection.  Live vaccines should be avoided while taking dupilumab. Dupilumab will also interact with certain medications such as warfarin and cyclosporine. The patient understands that monitoring is required and they must alert us or the primary physician if symptoms of infection or other concerning signs are noted.
Colchicine Counseling:  Patient counseled regarding adverse effects including but not limited to stomach upset (nausea, vomiting, stomach pain, or diarrhea).  Patient instructed to limit alcohol consumption while taking this medication.  Colchicine may reduce blood counts especially with prolonged use.  The patient understands that monitoring of kidney function and blood counts may be required, especially at baseline. The patient verbalized understanding of the proper use and possible adverse effects of colchicine.  All of the patient's questions and concerns were addressed.
Solaraze Pregnancy And Lactation Text: This medication is Pregnancy Category B and is considered safe. There is some data to suggest avoiding during the third trimester. It is unknown if this medication is excreted in breast milk.
Cellcept Counseling:  I discussed with the patient the risks of mycophenolate mofetil including but not limited to infection/immunosuppression, GI upset, hypokalemia, hypercholesterolemia, bone marrow suppression, lymphoproliferative disorders, malignancy, GI ulceration/bleed/perforation, colitis, interstitial lung disease, kidney failure, progressive multifocal leukoencephalopathy, and birth defects.  The patient understands that monitoring is required including a baseline creatinine and regular CBC testing. In addition, patient must alert us immediately if symptoms of infection or other concerning signs are noted.
Minoxidil Counseling: Minoxidil is a topical medication which can increase blood flow where it is applied. It is uncertain how this medication increases hair growth. Side effects are uncommon and include stinging and allergic reactions.
Metronidazole Pregnancy And Lactation Text: This medication is Pregnancy Category B and considered safe during pregnancy.  It is also excreted in breast milk.
Rifampin Counseling: I discussed with the patient the risks of rifampin including but not limited to liver damage, kidney damage, red-orange body fluids, nausea/vomiting and severe allergy.
Rifampin Pregnancy And Lactation Text: This medication is Pregnancy Category C and it isn't know if it is safe during pregnancy. It is also excreted in breast milk and should not be used if you are breast feeding.
Valtrex Counseling: I discussed with the patient the risks of valacyclovir including but not limited to kidney damage, nausea, vomiting and severe allergy.  The patient understands that if the infection seems to be worsening or is not improving, they are to call.
Hydroxychloroquine Counseling:  I discussed with the patient that a baseline ophthalmologic exam is needed at the start of therapy and every year thereafter while on therapy. A CBC may also be warranted for monitoring.  The side effects of this medication were discussed with the patient, including but not limited to agranulocytosis, aplastic anemia, seizures, rashes, retinopathy, and liver toxicity. Patient instructed to call the office should any adverse effect occur.  The patient verbalized understanding of the proper use and possible adverse effects of Plaquenil.  All the patient's questions and concerns were addressed.
Otezla Pregnancy And Lactation Text: This medication is Pregnancy Category C and it isn't known if it is safe during pregnancy. It is unknown if it is excreted in breast milk.
Griseofulvin Counseling:  I discussed with the patient the risks of griseofulvin including but not limited to photosensitivity, cytopenia, liver damage, nausea/vomiting and severe allergy.  The patient understands that this medication is best absorbed when taken with a fatty meal (e.g., ice cream or french fries).
Ketoconazole Pregnancy And Lactation Text: This medication is Pregnancy Category C and it isn't know if it is safe during pregnancy. It is also excreted in breast milk and breast feeding isn't recommended.
Methotrexate Counseling:  Patient counseled regarding adverse effects of methotrexate including but not limited to nausea, vomiting, abnormalities in liver function tests. Patients may develop mouth sores, rash, diarrhea, and abnormalities in blood counts. The patient understands that monitoring is required including LFT's and blood counts.  There is a rare possibility of scarring of the liver and lung problems that can occur when taking methotrexate. Persistent nausea, loss of appetite, pale stools, dark urine, cough, and shortness of breath should be reported immediately. Patient advised to discontinue methotrexate treatment at least three months before attempting to become pregnant.  I discussed the need for folate supplements while taking methotrexate.  These supplements can decrease side effects during methotrexate treatment. The patient verbalized understanding of the proper use and possible adverse effects of methotrexate.  All of the patient's questions and concerns were addressed.
Arava Counseling:  Patient counseled regarding adverse effects of Arava including but not limited to nausea, vomiting, abnormalities in liver function tests. Patients may develop mouth sores, rash, diarrhea, and abnormalities in blood counts. The patient understands that monitoring is required including LFTs and blood counts.  There is a rare possibility of scarring of the liver and lung problems that can occur when taking methotrexate. Persistent nausea, loss of appetite, pale stools, dark urine, cough, and shortness of breath should be reported immediately. Patient advised to discontinue Arava treatment and consult with a physician prior to attempting conception. The patient will have to undergo a treatment to eliminate Arava from the body prior to conception.
Hydroxyzine Counseling: Patient advised that the medication is sedating and not to drive a car after taking this medication.  Patient informed of potential adverse effects including but not limited to dry mouth, urinary retention, and blurry vision.  The patient verbalized understanding of the proper use and possible adverse effects of hydroxyzine.  All of the patient's questions and concerns were addressed.
Propranolol Pregnancy And Lactation Text: This medication is Pregnancy Category C and it isn't known if it is safe during pregnancy. It is excreted in breast milk.
Azathioprine Counseling:  I discussed with the patient the risks of azathioprine including but not limited to myelosuppression, immunosuppression, hepatotoxicity, lymphoma, and infections.  The patient understands that monitoring is required including baseline LFTs, Creatinine, possible TPMP genotyping and weekly CBCs for the first month and then every 2 weeks thereafter.  The patient verbalized understanding of the proper use and possible adverse effects of azathioprine.  All of the patient's questions and concerns were addressed.
Detail Level: Simple
Griseofulvin Pregnancy And Lactation Text: This medication is Pregnancy Category X and is known to cause serious birth defects. It is unknown if this medication is excreted in breast milk but breast feeding should be avoided.
Skyrizi Counseling: I discussed with the patient the risks of risankizumab-rzaa including but not limited to immunosuppression, and serious infections.  The patient understands that monitoring is required including a PPD at baseline and must alert us or the primary physician if symptoms of infection or other concerning signs are noted.
Prednisone Counseling:  I discussed with the patient the risks of prolonged use of prednisone including but not limited to weight gain, insomnia, osteoporosis, mood changes, diabetes, susceptibility to infection, glaucoma and high blood pressure.  In cases where prednisone use is prolonged, patients should be monitored with blood pressure checks, serum glucose levels and an eye exam.  Additionally, the patient may need to be placed on GI prophylaxis, PCP prophylaxis, and calcium and vitamin D supplementation and/or a bisphosphonate.  The patient verbalized understanding of the proper use and the possible adverse effects of prednisone.  All of the patient's questions and concerns were addressed.
Topical Sulfur Applications Pregnancy And Lactation Text: This medication is Pregnancy Category C and has an unknown safety profile during pregnancy. It is unknown if this topical medication is excreted in breast milk.
Erythromycin Counseling:  I discussed with the patient the risks of erythromycin including but not limited to GI upset, allergic reaction, drug rash, diarrhea, increase in liver enzymes, and yeast infections.
Birth Control Pills Counseling: Birth Control Pill Counseling: I discussed with the patient the potential side effects of OCPs including but not limited to increased risk of stroke, heart attack, thrombophlebitis, deep venous thrombosis, hepatic adenomas, breast changes, GI upset, headaches, and depression.  The patient verbalized understanding of the proper use and possible adverse effects of OCPs. All of the patient's questions and concerns were addressed.
Gabapentin Counseling: I discussed with the patient the risks of gabapentin including but not limited to dizziness, somnolence, fatigue and ataxia.

## 2020-10-21 ENCOUNTER — RX ONLY (OUTPATIENT)
Age: 35
Setting detail: RX ONLY
End: 2020-10-21

## 2020-10-21 RX ORDER — DOXYCYCLINE 40 MG/1
CAPSULE ORAL
Qty: 30 | Refills: 1 | Status: ERX | COMMUNITY
Start: 2020-10-20

## 2020-12-01 ENCOUNTER — APPOINTMENT (RX ONLY)
Dept: URBAN - NONMETROPOLITAN AREA CLINIC 29 | Facility: CLINIC | Age: 35
Setting detail: DERMATOLOGY
End: 2020-12-01

## 2020-12-01 DIAGNOSIS — L30.0 NUMMULAR DERMATITIS: ICD-10-CM

## 2020-12-01 DIAGNOSIS — L71.8 OTHER ROSACEA: ICD-10-CM

## 2020-12-01 PROCEDURE — ? COUNSELING

## 2020-12-01 PROCEDURE — ? PATIENT SPECIFIC COUNSELING

## 2020-12-01 PROCEDURE — 99213 OFFICE O/P EST LOW 20 MIN: CPT

## 2020-12-01 PROCEDURE — ? IN-HOUSE DISPENSING PHARMACY

## 2020-12-01 PROCEDURE — ? PRESCRIPTION

## 2020-12-01 RX ORDER — TRIAMCINOLONE ACETONIDE 1 MG/G
OINTMENT TOPICAL BID
Qty: 1 | Refills: 1 | Status: ERX | COMMUNITY
Start: 2020-12-01

## 2020-12-01 RX ADMIN — TRIAMCINOLONE ACETONIDE: 1 OINTMENT TOPICAL at 00:00

## 2020-12-01 ASSESSMENT — LOCATION SIMPLE DESCRIPTION DERM
LOCATION SIMPLE: RIGHT CALF
LOCATION SIMPLE: LEFT CHEEK
LOCATION SIMPLE: RIGHT CHEEK
LOCATION SIMPLE: LEFT POPLITEAL SKIN

## 2020-12-01 ASSESSMENT — LOCATION DETAILED DESCRIPTION DERM
LOCATION DETAILED: LEFT POPLITEAL SKIN
LOCATION DETAILED: LEFT CENTRAL MALAR CHEEK
LOCATION DETAILED: RIGHT PROXIMAL CALF
LOCATION DETAILED: RIGHT CENTRAL MALAR CHEEK

## 2020-12-01 ASSESSMENT — LOCATION ZONE DERM
LOCATION ZONE: LEG
LOCATION ZONE: FACE

## 2020-12-01 NOTE — PROCEDURE: PATIENT SPECIFIC COUNSELING
Apply rosacea triple gel QD\\nHasn’t used regularly \\n\\nDidn’t tolerate doxycycline bc GI upset
Detail Level: Detailed

## 2020-12-01 NOTE — PROCEDURE: IN-HOUSE DISPENSING PHARMACY
Product 63 Application Directions: Apply pea sized amount nightly
Product 4 Amount/Unit (Numbers Only): 30
Product 9 Application Directions: Apply pea sized amount to affected areas 2-3 nights per wk, work up to QHS.
Product 2 Units Dispensed: 0
Name Of Product 8: Tretinoin 0.025% / Clindamycin Combination Cream
Detail Level: Zone
Product 1 Refills: 1
Product 29 Unit Type: grams
Product 10 Refills: 3
Name Of Product 51: Hydrating tretinoin 0.05%
Name Of Product 10: Female Hormonal Acne Gel
Name Of Product 21: Anti-Fungal Shampoo
Product 12 Price/Unit (In Dollars): 45.00
Name Of Product 4: BP 2.5% / Clindamycin Combination Gel
Product 43 Application Directions: Spray on scalp once daily
Product 28 Price/Unit (In Dollars): 30.00
Product 30 Price/Unit (In Dollars): 50.00
Name Of Product 26: Ketoconazole and hydrocortisone cream
Product 61 Unit Type: mg
Product 44 Amount/Unit (Numbers Only): 240
Name Of Product 5: BP 8% Acne Wash
Product 26 Refills: 2
Name Of Product 1: Sodium sulfacetamide 8%
Product 23 Application Directions: Apply to affected areas daily up to two weeks per month
Product 5 Application Directions: Wash face daily
Name Of Product 45: Skin brightening hydroquinone 8% combination sensitive skin
Product 26 Price/Unit (In Dollars): 40.00
Product 10 Price/Unit (In Dollars): 50
Name Of Product 11: Metronidazole Gel
Product 24 Amount/Unit (Numbers Only): 60
Name Of Product 42: Skin Brightening Hydroquinone 8% Combination
Name Of Product 43: Alopecia Topical Solution for Men
Product 21 Application Directions: Lather to ear 3 times weekly as needed
Name Of Product 41: AK Imiquimod Gel
Name Of Product 24: Fluocinolone Scalp and Body Oil
Product 4 Application Directions: Apply once daily in the AM
Name Of Product 13: Rosacea Cream
Product 21 Amount/Unit (Numbers Only): 120
Product 1 Application Directions: Wash face every morning
Product 44 Application Directions: Apply lightly once nightly
Product 12 Application Directions: Apply pea sized amount to face QD
Name Of Product 29: Triamcinolone Cream (120 grams)
Render Refills If Set To 0: Yes
Product 29 Price/Unit (In Dollars): 65.00
Product 26 Application Directions: Apply bid for 2 weeks
Product 33 Application Directions: AppLy to scalp 3 times per week let sit 5 minutes then rinse
Product 27 Application Directions: Apply twice daily to affected areas for up to 2 weeks/month PRN.
Name Of Product 22: Clobetasol Cream
Name Of Product 61: Hydrating tretinoin 0.025%
Name Of Product 9: Hydrating 0.05% Tretinoin Cream
Name Of Product 25: Fluocinonide Cream
Product 32 Refills: 5
Name Of Product 3: Adapalene 0.3% Gel
Product 22 Application Directions: Apply twice a day for 2 weeks then stop for 1 week.
Name Of Product 44: Anti-aging Body Tretinoin
Name Of Product 31: Urea 40% cream
Product 44 Price/Unit (In Dollars): 90.00
Product 46 Application Directions: Apply to affected nails daily x 12 weeks
Product 62 Application Directions: Apply daily to legs
Product 11 Application Directions: Apply daily in AM
Product 5 Price/Unit (In Dollars): 0.00
Name Of Product 33: Seborrheic Dermatitis Shampoo 120ml
Product 32 Unit Type: tube(s)
Name Of Product 6: Clindamycin Gel
Product 30 Application Directions: Apply BID to scalp x 2 weeks
Product 2 Application Directions: Apply a pea-sized amount to entire face once nightly.
Name Of Product 48: Seborrheic Dermatitis Shampoo
Product 42 Application Directions: Apply topically to face once nightly, wash in AM. Wear sunscreen
Name Of Product 46: Anti fungal nail solution
Name Of Product 2: Acne Triple Gel Combination
Name Of Product 63: Hydrating tretinoin 0.1%
Product 10 Application Directions: Apply to face in AM.
Name Of Product 30: Clobetasol solution
Name Of Product 23: Clobetasol Ointment
Name Of Product 7: Dapsone 6% Gel
Product 24 Application Directions: Spray on shoulders after shower
Name Of Product 27: Hydrocortisone 2.5% / Tranilast 0.5% / Levo 2%
Name Of Product 12: Rosacea Triple Combination Gel
Name Of Product 32: Tacrolimus 0.1% Ointment
Product 29 Application Directions: Apply to affected areas bid for two weeks per month.
Product 61 Application Directions: Apply pea sized amount nightly to face
Product 48 Application Directions: Wash scalp 3 times weekly let sit for 5 minutes then rinse
Product 31 Application Directions: Apply to affected area twice daily for two weeks.
Name Of Product 28: Triamcinolone Cream (30 gram)
Name Of Product 62: Arnica bruise cream

## 2020-12-01 NOTE — PROCEDURE: MIPS QUALITY
Detail Level: Detailed
Quality 130: Documentation Of Current Medications In The Medical Record: Current Medications Documented
Quality 265: Biopsy Follow-Up: Biopsy results reviewed, communicated, tracked, and documented
Quality 431: Preventive Care And Screening: Unhealthy Alcohol Use - Screening: Patient screened for unhealthy alcohol use using a single question and scores less than 2 times per year

## 2023-07-20 ENCOUNTER — APPOINTMENT (RX ONLY)
Dept: URBAN - NONMETROPOLITAN AREA CLINIC 30 | Facility: CLINIC | Age: 38
Setting detail: DERMATOLOGY
End: 2023-07-20

## 2023-07-20 DIAGNOSIS — Z41.9 ENCOUNTER FOR PROCEDURE FOR PURPOSES OTHER THAN REMEDYING HEALTH STATE, UNSPECIFIED: ICD-10-CM

## 2023-07-20 PROCEDURE — ? MEDICAL CONSULTATION: PHOTODYNAMIC THERAPY

## 2023-07-20 PROCEDURE — ? INVENTORY

## 2023-07-20 PROCEDURE — ? COSMETIC CONSULTATION - PULSED-DYE LASER

## 2023-07-20 ASSESSMENT — LOCATION SIMPLE DESCRIPTION DERM: LOCATION SIMPLE: RIGHT CHEEK

## 2023-07-20 ASSESSMENT — LOCATION ZONE DERM: LOCATION ZONE: FACE

## 2023-07-20 ASSESSMENT — LOCATION DETAILED DESCRIPTION DERM: LOCATION DETAILED: RIGHT CENTRAL MALAR CHEEK

## 2023-07-27 ENCOUNTER — APPOINTMENT (RX ONLY)
Dept: URBAN - NONMETROPOLITAN AREA CLINIC 30 | Facility: CLINIC | Age: 38
Setting detail: DERMATOLOGY
End: 2023-07-27

## 2023-07-27 DIAGNOSIS — L71.8 OTHER ROSACEA: ICD-10-CM

## 2023-07-27 PROCEDURE — ? COUNSELING

## 2023-07-27 PROCEDURE — ? INVENTORY

## 2023-07-27 PROCEDURE — ? VBEAM PRIMA LASER

## 2023-07-27 PROCEDURE — ? PATIENT SPECIFIC COUNSELING

## 2023-07-27 ASSESSMENT — LOCATION DETAILED DESCRIPTION DERM
LOCATION DETAILED: LEFT CENTRAL MALAR CHEEK
LOCATION DETAILED: RIGHT CENTRAL MALAR CHEEK

## 2023-07-27 ASSESSMENT — LOCATION SIMPLE DESCRIPTION DERM
LOCATION SIMPLE: RIGHT CHEEK
LOCATION SIMPLE: LEFT CHEEK

## 2023-07-27 ASSESSMENT — LOCATION ZONE DERM: LOCATION ZONE: FACE

## 2023-07-27 NOTE — PROCEDURE: VBEAM PRIMA LASER
Cryogen Time (Ms): 30
Delay Time (Ms): 20
Spot Size: 7 mm
Detail Level: Zone
Consent: Written consent obtained, risks reviewed including but not limited to crusting, scabbing, blistering, scarring, darker or lighter pigmentary change, incidental hair removal, bruising, and/or incomplete removal.
Post-Care Instructions: I reviewed with the patient in detail post-care instructions. Patient should stay away from the sun and wear sun protection until treated areas are fully healed.
Post-Procedure Care: Vaseline and ice applied. Post care reviewed with patient.

## 2023-08-31 ENCOUNTER — APPOINTMENT (RX ONLY)
Dept: URBAN - NONMETROPOLITAN AREA CLINIC 30 | Facility: CLINIC | Age: 38
Setting detail: DERMATOLOGY
End: 2023-08-31

## 2023-08-31 DIAGNOSIS — L71.8 OTHER ROSACEA: ICD-10-CM

## 2023-08-31 PROCEDURE — ? COUNSELING

## 2023-08-31 PROCEDURE — ? VBEAM PRIMA LASER

## 2023-08-31 PROCEDURE — ? INVENTORY

## 2023-08-31 PROCEDURE — ? PATIENT SPECIFIC COUNSELING

## 2023-08-31 ASSESSMENT — LOCATION SIMPLE DESCRIPTION DERM
LOCATION SIMPLE: LEFT CHEEK
LOCATION SIMPLE: RIGHT CHEEK

## 2023-08-31 ASSESSMENT — LOCATION DETAILED DESCRIPTION DERM
LOCATION DETAILED: LEFT CENTRAL MALAR CHEEK
LOCATION DETAILED: RIGHT CENTRAL MALAR CHEEK

## 2023-08-31 ASSESSMENT — LOCATION ZONE DERM: LOCATION ZONE: FACE

## 2023-10-26 ENCOUNTER — APPOINTMENT (RX ONLY)
Dept: URBAN - NONMETROPOLITAN AREA CLINIC 30 | Facility: CLINIC | Age: 38
Setting detail: DERMATOLOGY
End: 2023-10-26

## 2023-10-26 DIAGNOSIS — Z41.9 ENCOUNTER FOR PROCEDURE FOR PURPOSES OTHER THAN REMEDYING HEALTH STATE, UNSPECIFIED: ICD-10-CM

## 2023-10-26 PROCEDURE — ? INVENTORY

## 2023-10-26 PROCEDURE — ? PATIENT SPECIFIC COUNSELING

## 2023-10-26 PROCEDURE — ? VBEAM PRIMA LASER

## 2023-10-26 ASSESSMENT — LOCATION ZONE DERM
LOCATION ZONE: NOSE
LOCATION ZONE: FACE

## 2023-10-26 ASSESSMENT — LOCATION SIMPLE DESCRIPTION DERM
LOCATION SIMPLE: NOSE
LOCATION SIMPLE: RIGHT CHEEK
LOCATION SIMPLE: LEFT CHEEK

## 2023-10-26 ASSESSMENT — LOCATION DETAILED DESCRIPTION DERM
LOCATION DETAILED: NASAL DORSUM
LOCATION DETAILED: RIGHT INFERIOR CENTRAL MALAR CHEEK
LOCATION DETAILED: LEFT CENTRAL MALAR CHEEK

## 2023-10-26 NOTE — PROCEDURE: VBEAM PRIMA LASER
Spot Size: 7 mm
Cryogen Time (Ms): 30
Delay Time (Ms): 20
Detail Level: Zone
Consent: Written consent obtained, risks reviewed including but not limited to crusting, scabbing, blistering, scarring, darker or lighter pigmentary change, incidental hair removal, bruising, and/or incomplete removal.
Post-Procedure Care: Vaseline and ice applied. Post care reviewed with patient.
Post-Care Instructions: I reviewed with the patient in detail post-care instructions. Patient should stay away from the sun and wear sun protection until treated areas are fully healed.

## 2024-12-16 ENCOUNTER — APPOINTMENT (OUTPATIENT)
Dept: URBAN - NONMETROPOLITAN AREA CLINIC 30 | Facility: CLINIC | Age: 39
Setting detail: DERMATOLOGY
End: 2024-12-16

## 2024-12-16 DIAGNOSIS — Z71.89 OTHER SPECIFIED COUNSELING: ICD-10-CM

## 2024-12-16 DIAGNOSIS — L71.8 OTHER ROSACEA: ICD-10-CM | Status: INADEQUATELY CONTROLLED

## 2024-12-16 PROCEDURE — ? PATIENT SPECIFIC COUNSELING

## 2024-12-16 PROCEDURE — ? SUNSCREEN RECOMMENDATIONS

## 2024-12-16 PROCEDURE — 99214 OFFICE O/P EST MOD 30 MIN: CPT

## 2024-12-16 PROCEDURE — ? PRESCRIPTION

## 2024-12-16 PROCEDURE — ? COUNSELING

## 2024-12-16 RX ORDER — IVERMECTIN 10 MG/G
CREAM TOPICAL
Qty: 45 | Refills: 11 | Status: ERX | COMMUNITY
Start: 2024-12-16

## 2024-12-16 RX ADMIN — IVERMECTIN: 10 CREAM TOPICAL at 00:00

## 2024-12-16 ASSESSMENT — LOCATION DETAILED DESCRIPTION DERM: LOCATION DETAILED: RIGHT MEDIAL BUCCAL CHEEK

## 2024-12-16 ASSESSMENT — LOCATION ZONE DERM: LOCATION ZONE: FACE

## 2024-12-16 ASSESSMENT — LOCATION SIMPLE DESCRIPTION DERM: LOCATION SIMPLE: RIGHT CHEEK

## 2024-12-16 NOTE — PROCEDURE: PATIENT SPECIFIC COUNSELING
[place]. Has been using rosacea triple cream\\nShe doesn’t want po antibiotic to decrease faster\\nRv 4 weeks if not improved
Detail Level: Zone

## 2024-12-20 ENCOUNTER — RX ONLY (RX ONLY)
Age: 39
End: 2024-12-20

## 2024-12-20 RX ORDER — IVERMECTIN 10 MG/G
CREAM TOPICAL
Qty: 45 | Refills: 4 | Status: ERX | COMMUNITY
Start: 2024-12-20